# Patient Record
Sex: FEMALE | Race: WHITE | NOT HISPANIC OR LATINO | Employment: OTHER | URBAN - METROPOLITAN AREA
[De-identification: names, ages, dates, MRNs, and addresses within clinical notes are randomized per-mention and may not be internally consistent; named-entity substitution may affect disease eponyms.]

---

## 2019-09-27 ENCOUNTER — TELEPHONE (OUTPATIENT)
Dept: PHARMACY | Facility: CLINIC | Age: 59
End: 2019-09-27

## 2019-10-01 NOTE — TELEPHONE ENCOUNTER
DOCUMENTATION ONLY:  Prior authorization for HUMIRA PEN 40mg/0.8mL approved from 10/01/2019 to 04/01/2020.    Case ID# [id number]     Co-pay: $0    Patient Assistance IS NOT required.     Forward to clinical pharmacist for consult & shipment.

## 2019-11-29 ENCOUNTER — TELEPHONE (OUTPATIENT)
Dept: PHARMACY | Facility: CLINIC | Age: 59
End: 2019-11-29

## 2019-12-03 PROBLEM — M16.11 PRIMARY OSTEOARTHRITIS OF RIGHT HIP: Status: ACTIVE | Noted: 2019-12-03

## 2019-12-26 NOTE — TELEPHONE ENCOUNTER
Patient returned call & confirmed shipment of Humira refill. Verified 2 patient identifiers. Patient approved $0 copay. No new medications, allergies, or health conditions. Confirmed that dosage has not changed. No missed doses. 0 doses remaining. Next dose due on Wed 1/1. Shipping address confirmed. Signature requirement declined. Patient confirmed that no supplies are needed. Medication will ship Mon 12/30 for Tues 12/31 delivery. Patient voiced understanding & has no questions or concerns at this time. ANNIKA

## 2020-02-19 ENCOUNTER — TELEPHONE (OUTPATIENT)
Dept: PHARMACY | Facility: CLINIC | Age: 60
End: 2020-02-19

## 2020-02-19 PROBLEM — N17.9 AKI (ACUTE KIDNEY INJURY): Status: ACTIVE | Noted: 2020-02-19

## 2020-02-19 PROBLEM — I10 HYPERTENSION: Status: ACTIVE | Noted: 2020-02-19

## 2020-02-19 PROBLEM — R42 DIZZINESS: Status: ACTIVE | Noted: 2020-02-19

## 2020-02-19 PROBLEM — I95.9 HYPOTENSION: Status: ACTIVE | Noted: 2020-02-19

## 2020-02-19 PROBLEM — E87.6 HYPOKALEMIA: Status: ACTIVE | Noted: 2020-02-19

## 2020-02-19 PROBLEM — M06.9 RA (RHEUMATOID ARTHRITIS): Status: ACTIVE | Noted: 2020-02-19

## 2020-02-19 PROBLEM — E86.1 INTRAVASCULAR VOLUME DEPLETION: Status: ACTIVE | Noted: 2020-02-19

## 2020-02-20 PROBLEM — E87.6 HYPOKALEMIA: Status: RESOLVED | Noted: 2020-02-19 | Resolved: 2020-02-20

## 2020-02-20 PROBLEM — N17.9 AKI (ACUTE KIDNEY INJURY): Status: RESOLVED | Noted: 2020-02-19 | Resolved: 2020-02-20

## 2020-03-18 ENCOUNTER — TELEPHONE (OUTPATIENT)
Dept: PHARMACY | Facility: CLINIC | Age: 60
End: 2020-03-18

## 2020-04-15 ENCOUNTER — TELEPHONE (OUTPATIENT)
Dept: PHARMACY | Facility: CLINIC | Age: 60
End: 2020-04-15

## 2020-06-09 ENCOUNTER — TELEPHONE (OUTPATIENT)
Dept: PHARMACY | Facility: CLINIC | Age: 60
End: 2020-06-09

## 2020-07-08 ENCOUNTER — TELEPHONE (OUTPATIENT)
Dept: PHARMACY | Facility: CLINIC | Age: 60
End: 2020-07-08

## 2020-07-09 ENCOUNTER — TELEPHONE (OUTPATIENT)
Dept: PHARMACY | Facility: CLINIC | Age: 60
End: 2020-07-09

## 2020-07-09 NOTE — TELEPHONE ENCOUNTER
Pt was reached on 7/9 regarding switch to Humira CF formulation. Informed pt of differences between Humira and Humria CF ( citrate-free, 0.4ml, smaller needle size). Pt voiced understanding and plans to begin CF with next refill. Will update fill shipping 7/13 for 7/14 delivery to CF formulation since original formulation Rx discontinued. Next dose due 7/15. Pt had no further questions or concerns, and was advised to call [facility] should any arise.

## 2020-07-09 NOTE — TELEPHONE ENCOUNTER
Informed Patient  that [facility] received prescription for Huymira CF and benefits investigation is required.  [facility] will be back in touch once insurance determination is received.    Informed patient that [facility] received prescription for ARAVA. Patient requested to have medication sent to [facility] [address] [address]

## 2020-08-05 ENCOUNTER — TELEPHONE (OUTPATIENT)
Dept: PHARMACY | Facility: CLINIC | Age: 60
End: 2020-08-05

## 2020-09-15 NOTE — TELEPHONE ENCOUNTER
Confirmed Humira shipment  to arrive to patient home . Dr Holloway stated that patient was okay to resume Humira instead of Humira CF since patient was hesitant to restart CF formulation after recent hospitalization/fluctuation in sodium levels. Patient stated that she plans to restart Humira Thursday,  once shipment received. Address and  verified. $0 copay (004).

## 2020-10-13 ENCOUNTER — TELEPHONE (OUTPATIENT)
Dept: PHARMACY | Facility: CLINIC | Age: 60
End: 2020-10-13

## 2020-10-13 NOTE — TELEPHONE ENCOUNTER
Confirmed Humira shipment 10/15 to arrive to patient home 10/16. Patient reported one dose on hand and one missed dose in the past month. Patient stated she was recently bit by something and prescribed Bactrim for treatment - has been holding Humira appropriately while on antibiotics. Advised patient that she could take her next dose Saturday after completion of antibiotics. Reviewed with patient to hold therapy if she's running fever or showing any signs/symptoms of infection. Patient voiced understanding. Address and  verifed. $0 copay (004).

## 2020-11-20 ENCOUNTER — SPECIALTY PHARMACY (OUTPATIENT)
Dept: PHARMACY | Facility: CLINIC | Age: 60
End: 2020-11-20

## 2020-11-20 DIAGNOSIS — M06.9 RHEUMATOID ARTHRITIS, INVOLVING UNSPECIFIED SITE, UNSPECIFIED WHETHER RHEUMATOID FACTOR PRESENT: Primary | ICD-10-CM

## 2020-11-20 NOTE — TELEPHONE ENCOUNTER
Specialty Pharmacy - Refill Coordination    Specialty Medication Orders Linked to Encounter      Most Recent Value   Medication #1  adalimumab (HUMIRA PEN) 40 mg/0.8 mL PnKt (Order#[id number], Rx#[id number])          Refill Questions - Documented Responses      Most Recent Value   Relationship to patient of person spoken to?  Self   HIPAA/medical authority confirmed?  Yes   Any changes in contact preferences or allowed representatives?  No   Has the patient had any insurance changes?  No   Has the patient had any changes to specialty medication, dose, or instructions?  No   Has the patient started taking any new medications, herbals, or supplements?  No   Has the patient been diagnosed with any new medical conditions?  No   Does the patient have any new allergies to medications or foods?  No   Does the patient have any concerns about side effects?  No   Can the patient store medication/sharps container properly (at the correct temperature, away from children/pets, etc.)?  Yes   Can the patient call emergency services (911) in the event of an emergency?  Yes   Does the patient have any concerns or questions about taking or administering this medication as prescribed?  No   How many doses did the patient miss in the past 4 weeks or since the last fill?  0   How many doses does the patient have on hand?  0   Does the number of doses/days supply remaining match pharmacy expected amounts?  Yes   Does the patient feel that this medication is effective?  Yes   During the past 4 weeks, has patient missed any activities due to condition or medication?  No   During the past 4 weeks, did patient have any of the following urgent care visits?  None   How will the patient receive the medication?  Mail   When does the patient need to receive the medication?  11/27/20   Shipping Address  Home   Address in [facility] confirmed and updated if neccessary?  Yes   Expected Copay ($)  0   Is the patient able to afford the  medication copay?  Yes   Payment Method  zero copay   Days supply of Refill  28   Would patient like to speak to a pharmacist?  No   Do you want to trigger an intervention?  No   Do you want to trigger an additional referral task?  No   Refill activity completed?  Yes   Refill activity plan  Refill scheduled   Shipment/Pickup Date:  11/23/20          Current Outpatient Medications   Medication Sig    adalimumab (HUMIRA PEN) 40 mg/0.8 mL PnKt Inject 1 pen (40 mg total) into the skin every 14 (fourteen) days.    albuterol (PROVENTIL/VENTOLIN HFA) 90 mcg/actuation inhaler INHALE 2 PUFFS BY MOUTH EVERY 6 HOURS AS NEEDED FOR SHORTNESS OF BREATH or cough    amLODIPine (NORVASC) 5 MG tablet Take 5 mg by mouth once daily.    cyclobenzaprine (FLEXERIL) 10 MG tablet Take 10 mg by mouth every 8 (eight) hours as needed.    DULERA 200-5 mcg/actuation inhaler INHALE 2 PUFFS BY MOUTH TWICE A DAY AS DIRECTED    fluticasone propionate (FLONASE) 50 mcg/actuation nasal spray USE 1 SPRAY IN EACH NOSTRIL ONCE DAILY FOR 7 DAYS (60 day supply)    ibuprofen (ADVIL,MOTRIN) 600 MG tablet Take 600 mg by mouth 4 (four) times daily as needed.    indomethacin (INDOCIN) 25 MG capsule Take 25 mg by mouth.    leflunomide (ARAVA) 20 MG Tab Take 1 tablet (20 mg total) by mouth once daily.    lisinopriL 10 MG tablet Take 10 mg by mouth once daily.    lisinopriL-hydrochlorothiazide (PRINZIDE,ZESTORETIC) 10-12.5 mg per tablet Take 1 tablet by mouth once daily.     pantoprazole (PROTONIX) 40 MG tablet Take 40 mg by mouth once daily.    potassium chloride (KLOR-CON) 10 MEQ TbSR     predniSONE (DELTASONE) 5 MG tablet TAKE 1/2 TABLET BY MOUTH ONCE DAILY IN THE MORNING    promethazine (PHENERGAN) 12.5 MG Tab Take 1 tablet (12.5 mg total) by mouth every 8 (eight) hours as needed.   Last reviewed on 11/20/2020  2:07 PM by Carol Capps    Review of patient's allergies indicates:   Allergen Reactions    Melatonin Other (See Comments)      Restless leg syndrome    Last reviewed on  11/20/2020 2:07 PM by Carol Capps      Tasks added this encounter   12/18/2020 - Refill Call (Auto Added)   Tasks due within next 3 months   12/3/2020 - Clinical - Follow Up Assesement (90 day)     Carol Capps  [facility] - Specialty Pharm[facility]  [address] [place] [address]  Phone: [phone]  Fax: [phone]

## 2020-12-04 ENCOUNTER — SPECIALTY PHARMACY (OUTPATIENT)
Dept: PHARMACY | Facility: CLINIC | Age: 60
End: 2020-12-04

## 2020-12-04 DIAGNOSIS — M06.9 RHEUMATOID ARTHRITIS, INVOLVING UNSPECIFIED SITE, UNSPECIFIED WHETHER RHEUMATOID FACTOR PRESENT: Primary | ICD-10-CM

## 2020-12-04 NOTE — TELEPHONE ENCOUNTER
Specialty Pharmacy - Clinical Reassessment    Specialty Medication Orders Linked to Encounter      Most Recent Value   Medication #1  adalimumab (HUMIRA PEN) 40 mg/0.8 mL PnKt (Order#[id number], Rx#[id number])        Sally Lagunas is a 60 y.o. female, who is followed by the specialty pharmacy service for management and education.    Recent Encounters     Date Type Provider Description    12/04/2020 Specialty Pharmacy Candy Guerrero PharmD Follow-up Clinical Reassessment    11/20/2020 Specialty Pharmacy Carol Capps Refill Coordination        Clinical call attempts since last clinical assessment   No call attempts found.     Today she received follow up education for her specialty medication(s).    Current Outpatient Medications   Medication Sig Note    adalimumab (HUMIRA PEN) 40 mg/0.8 mL PnKt Inject 1 pen (40 mg total) into the skin every 14 (fourteen) days.     albuterol (PROVENTIL/VENTOLIN HFA) 90 mcg/actuation inhaler INHALE 2 PUFFS BY MOUTH EVERY 6 HOURS AS NEEDED FOR SHORTNESS OF BREATH or cough     cyclobenzaprine (FLEXERIL) 10 MG tablet Take 10 mg by mouth every 8 (eight) hours as needed.     DULERA 200-5 mcg/actuation inhaler INHALE 2 PUFFS BY MOUTH TWICE A DAY AS DIRECTED     fluticasone propionate (FLONASE) 50 mcg/actuation nasal spray USE 1 SPRAY IN EACH NOSTRIL ONCE DAILY FOR 7 DAYS (60 day supply)     ibuprofen (ADVIL,MOTRIN) 600 MG tablet Take 600 mg by mouth 4 (four) times daily as needed.     lisinopriL 10 MG tablet Take 10 mg by mouth once daily.     leflunomide (ARAVA) 20 MG Tab Take 1 tablet (20 mg total) by mouth once daily. 12/4/2020: Patient stated she has been advised to hold Leflunomide    Last reviewed on 12/4/2020  1:23 PM by Candy Guerrero, PharmD    Review of patient's allergies indicates:   Allergen Reactions    Melatonin Other (See Comments)     Restless leg syndrome   Last reviewed on  12/4/2020 12:57 PM by Candy Guerrero    Drug Interactions    Drug  interactions evaluated: yes  Clinically relevant drug interactions identified: no  Provided the patient with educational material regarding drug interactions: not applicable       Medication Adherence    What concerns does the patient have in regards to their medications: Patient reproted no concerns with Humira. She stated she has missed 0 doses. Next dose due Sat 12/5. Patient stated she has 1 pen on hand.   Patient reported X missed doses in the last month: 0  Any gaps in refill history greater than 2 weeks in the last 3 months: no  Demonstrates understanding of importance of adherence: yes  Informant: patient  Reliability of informant: reliable  Provider-estimated medication adherence level: good  Reasons for non-adherence: no problems identified  Confirmed plan for next specialty medication refill: not applicable  Refills needed for supportive medications: not needed       Adverse Effects    Arthralgias: Pos       Assessment Questions - Documented Responses      Most Recent Value   Assessment   Medication Reconciliation completed for patient  Yes   During the past 4 weeks, has patient missed any activities due to condition or medication?  No   During the past 4 weeks, did patient have any of the following urgent care visits?  None   Goals of Therapy Status  Partially achieving   Welcome packet contents reviewed and discussed with patient?  No   Assesment completed?  Yes   Plan  Therapy continued   Do you need to open a clinical intervention (i-vent)?  No   Do you want to schedule first shipment?  No   Medication #1 Assessment Info   Patient status  Existing medication, Exisiting to [facility]   Is this medication appropriate for the patient?  Yes   Is this medication effective?  Yes          Objective    She has a past medical history of Hypertension, Hypertension, and RA (rheumatoid arthritis).      BP Readings from Last 4 Encounters:   11/17/20 118/85   07/09/20 107/67   02/20/20 (!) 96/58   02/19/20 110/72     Ht  "Readings from Last 4 Encounters:   11/17/20 5' 3" (1.6 m)   07/09/20 5' 3" (1.6 m)   02/19/20 5' 3" (1.6 m)   02/19/20 5' 3" (1.6 m)     Wt Readings from Last 4 Encounters:   11/17/20 57.5 kg (126 lb 12.8 oz)   07/09/20 65.4 kg (144 lb 3.2 oz)   02/19/20 67.1 kg (148 lb)   02/19/20 67.3 kg (148 lb 6.4 oz)     Recent Labs   Lab Result Units 11/17/20  1159   Creatinine mg/dL 0.89   ALT U/L 17   AST U/L 28     The goals of rheumatoid arthritis treatment include:  · Relieving pain and suppressing inflammation  · Achieving remission and preventing joint and organ damage  · Increasing joint mobility and strength  · Preventing infection and other complications of treatment  · Reducing long term complications of rheumatoid arthritis  · Improving or maintaining physical function and optimal well-being  · Improving or maintaining quality of life  · Maintaining optimal therapy adherence  · Minimizing and managing side effects    Goals of Therapy Status: Partially achieving    Assessment/Plan  Patient plans to continue therapy without changes      Indication, dosage, appropriateness, effectiveness, safety and convenience of her specialty medication(s) were reviewed today.     Patient Counseling    Counseled the patient on the following: doses and administration discussed, possible adverse effects and management discussed, possible drug and prescription drug interactions discussed, possible drug and OTC drug and food interactions discussed, lab monitoring and follow-up discussed, therapeutic rationale discussed, adherence and missed doses discussed       Called patient on 12/4 for Humira follow up. Patient reproted no concerns with Humira. She stated she has missed 0 doses. Next dose due Sat 12/5. Patient stated she has 1 pen on hand.     Medication list was reviewed. She stated she is no longer taking Norvasc, Lisinopril-HCTZ, Indocin, Protonix, Potassium, or Phenergan. No DDIs of concern upon review. Pt stated she is holding " LFA at this time, per rheum advisement, due to weight loss.  She stated she has allergy to diphenhydramine and melatonin, causing leg spasm and RLS. Pt reported no other known allergies.     She stated she is comfortable with the Humira injection process, and has been rotating injection sites. She denies any side effects. Patient reported no missed plans in the last month due to RA. She reported no ER/UC visits in the last month. Patient stated she has benefited from Humira. She stated she has hand stiffness in the mornings that lasts about 30 minutes. She reported pain in hands (mostly), hips (had a replacement), and feet. Pt rated pain 5-6/10. She reported a little joint swelling. Patient had no further questions or concerns.     Updated labs reviewed (11/17/2020): plts elevated but improved (436); WBC, RBC, Hgb/Hct, ANC WNL; Lymph% low; LFTs WNL. No dose adjustments necessary. TB and Hep B negative 7/9/2020.  Tasks added this encounter   2/25/2021 - Clinical - Follow Up Assesement (90 day)   Tasks due within next 3 months   12/18/2020 - Refill Call (Auto Added)     Candy Guerrero PharmD  [facility] - Specialty Pharmacy  [address] [place] [id number]  Phone: [phone]  Fax: [phone]

## 2020-12-16 ENCOUNTER — SPECIALTY PHARMACY (OUTPATIENT)
Dept: PHARMACY | Facility: CLINIC | Age: 60
End: 2020-12-16

## 2020-12-16 NOTE — TELEPHONE ENCOUNTER
Specialty Pharmacy - Refill Coordination    Specialty Medication Orders Linked to Encounter      Most Recent Value   Medication #1  adalimumab (HUMIRA PEN) 40 mg/0.8 mL PnKt (Order#[id number], Rx#[id number])          Refill Questions - Documented Responses      Most Recent Value   Relationship to patient of person spoken to?  Self   HIPAA/medical authority confirmed?  Yes   Any changes in contact preferences or allowed representatives?  No   Has the patient had any insurance changes?  No   Has the patient had any changes to specialty medication, dose, or instructions?  No   Has the patient started taking any new medications, herbals, or supplements?  No   Has the patient been diagnosed with any new medical conditions?  No   Does the patient have any new allergies to medications or foods?  No   Does the patient have any concerns about side effects?  No   Can the patient store medication/sharps container properly (at the correct temperature, away from children/pets, etc.)?  Yes   Can the patient call emergency services (911) in the event of an emergency?  Yes   Does the patient have any concerns or questions about taking or administering this medication as prescribed?  No   How many doses did the patient miss in the past 4 weeks or since the last fill?  0   How many doses does the patient have on hand?  0   How many days does the patient report on hand quantity will last?  2   Does the number of doses/days supply remaining match pharmacy expected amounts?  Yes   Does the patient feel that this medication is effective?  Yes   During the past 4 weeks, has patient missed any activities due to condition or medication?  No   During the past 4 weeks, did patient have any of the following urgent care visits?  None   How will the patient receive the medication?  Mail   When does the patient need to receive the medication?  12/18/20   Shipping Address  Home   Address in [facility] confirmed and updated if neccessary?   Yes   Expected Copay ($)  0   Is the patient able to afford the medication copay?  Yes   Payment Method  zero copay   Days supply of Refill  28   Would patient like to speak to a pharmacist?  No   Do you want to trigger an intervention?  No   Do you want to trigger an additional referral task?  No   Refill activity completed?  Yes   Refill activity plan  Refill scheduled   Shipment/Pickup Date:  12/17/20          Current Outpatient Medications   Medication Sig    adalimumab (HUMIRA PEN) 40 mg/0.8 mL PnKt Inject 1 pen (40 mg total) into the skin every 14 (fourteen) days.    albuterol (PROVENTIL/VENTOLIN HFA) 90 mcg/actuation inhaler INHALE 2 PUFFS BY MOUTH EVERY 6 HOURS AS NEEDED FOR SHORTNESS OF BREATH or cough    cyclobenzaprine (FLEXERIL) 10 MG tablet Take 10 mg by mouth every 8 (eight) hours as needed.    DULERA 200-5 mcg/actuation inhaler INHALE 2 PUFFS BY MOUTH TWICE A DAY AS DIRECTED    fluticasone propionate (FLONASE) 50 mcg/actuation nasal spray USE 1 SPRAY IN EACH NOSTRIL ONCE DAILY FOR 7 DAYS (60 day supply)    ibuprofen (ADVIL,MOTRIN) 600 MG tablet Take 600 mg by mouth 4 (four) times daily as needed.    leflunomide (ARAVA) 20 MG Tab Take 1 tablet (20 mg total) by mouth once daily.    lisinopriL 10 MG tablet Take 10 mg by mouth once daily.   Last reviewed on 12/4/2020  1:23 PM by Candy Guerrero, PharmD    Review of patient's allergies indicates:   Allergen Reactions    Diphenhydramine      Restless leg syndrome / leg spasm    Melatonin Other (See Comments)     Restless leg syndrome    Last reviewed on  12/4/2020 12:57 PM by Candy Guerrero      Tasks added this encounter   No tasks added.   Tasks due within next 3 months   12/18/2020 - Refill Call (Auto Added)  2/25/2021 - Clinical - Follow Up Assesement (90 day)     [facility]  [address] [place] [id number]  Phone: [phone]  Fax: [phone]

## 2021-01-11 ENCOUNTER — SPECIALTY PHARMACY (OUTPATIENT)
Dept: PHARMACY | Facility: CLINIC | Age: 61
End: 2021-01-11

## 2021-02-05 ENCOUNTER — SPECIALTY PHARMACY (OUTPATIENT)
Dept: PHARMACY | Facility: CLINIC | Age: 61
End: 2021-02-05

## 2021-02-10 PROBLEM — M87.9 OSTEONECROSIS OF RIGHT HIP: Status: ACTIVE | Noted: 2021-02-10

## 2021-02-10 PROBLEM — M25.551 CHRONIC RIGHT HIP PAIN: Status: ACTIVE | Noted: 2021-02-10

## 2021-02-10 PROBLEM — G89.29 CHRONIC RIGHT HIP PAIN: Status: ACTIVE | Noted: 2021-02-10

## 2021-03-08 ENCOUNTER — SPECIALTY PHARMACY (OUTPATIENT)
Dept: PHARMACY | Facility: CLINIC | Age: 61
End: 2021-03-08

## 2021-03-31 ENCOUNTER — SPECIALTY PHARMACY (OUTPATIENT)
Dept: PHARMACY | Facility: CLINIC | Age: 61
End: 2021-03-31

## 2021-05-01 ENCOUNTER — SPECIALTY PHARMACY (OUTPATIENT)
Dept: PHARMACY | Facility: CLINIC | Age: 61
End: 2021-05-01

## 2021-05-26 ENCOUNTER — SPECIALTY PHARMACY (OUTPATIENT)
Dept: PHARMACY | Facility: CLINIC | Age: 61
End: 2021-05-26

## 2022-03-31 ENCOUNTER — SPECIALTY PHARMACY (OUTPATIENT)
Dept: PHARMACY | Facility: CLINIC | Age: 62
End: 2022-03-31

## 2022-03-31 NOTE — TELEPHONE ENCOUNTER
Pt called [facility] for Humira refill.  She was previously de-enrolled ss she was holding off on Humira while recovering from GI surgery.      PA is on file.  LIS level 1.  Copay $9.85.

## 2022-03-31 NOTE — TELEPHONE ENCOUNTER
Specialty Pharmacy - Medication/Referral Authorization  Specialty Pharmacy - Initial Clinical Assessment    Specialty Medication Orders Linked to Encounter    Flowsheet Row Most Recent Value   Medication #1 adalimumab (HUMIRA PEN) PnKt injection (Order#[id number], Rx#[id number])        Patient Diagnosis   M06.9 - RA (rheumatoid arthritis)    Sally Lagunas is a 61 y.o. female, who is followed by the specialty pharmacy service for management and education.    Recent Encounters     Date Type Provider Description    2022 Specialty Pharmacy Margot Wilson, [place] Referral Authorization; Initial Clinical Assessment    2021 Specialty Pharmacy Candy Urias, [place] Clinical Intervention    2021 Specialty Pharmacy Nancy Alas Refill Coordination    2021 Specialty Pharmacy Janette Gillespie Refill Coordination    2021 Specialty Pharmacy Kena Busch Refill Coordination        Clinical call attempts since last clinical assessment   No call attempts found.     Current Outpatient Medications   Medication Sig    adalimumab (HUMIRA PEN) PnKt injection Inject 1 pen (40 mg total) into the skin every 14 (fourteen) days.    albuterol (PROVENTIL) 2.5 mg /3 mL (0.083 %) nebulizer solution SMARTSI Vial(s) Via Nebulizer Every 6-8 Hours PRN    albuterol (PROVENTIL/VENTOLIN HFA) 90 mcg/actuation inhaler INHALE 2 PUFFS BY MOUTH EVERY 6 HOURS AS NEEDED FOR SHORTNESS OF BREATH or cough    DULERA 200-5 mcg/actuation inhaler INHALE 2 PUFFS BY MOUTH TWICE A DAY AS DIRECTED    fluticasone propionate (FLONASE) 50 mcg/actuation nasal spray USE 1 SPRAY IN EACH NOSTRIL ONCE DAILY FOR 7 DAYS (60 day supply)    hydrOXYzine pamoate (VISTARIL) 25 MG Cap     ibuprofen (ADVIL,MOTRIN) 600 MG tablet Take 600 mg by mouth 4 (four) times daily as needed.    leflunomide (ARAVA) 20 MG Tab Take 1 tablet (20 mg total) by mouth once daily.    potassium chloride (KLOR-CON) 10 MEQ TbSR Take 10 mEq by  mouth 2 (two) times daily.   Last reviewed on 3/31/2022  6:49 PM by Margot Wilson, PharmD    Review of patient's allergies indicates:   Allergen Reactions    Diphenhydramine      Restless leg syndrome / leg spasm    Melatonin Other (See Comments)     Restless leg syndrome    Nickel sutures [surgical stainless steel] Rash   Last reviewed on  3/31/2022 6:49 PM by Margot Wilson    Drug Interactions    Drug interactions evaluated: yes  Clinically relevant drug interactions identified: no  Provided the patient with educational material regarding drug interactions: not applicable         Adverse Effects    Arthralgias: Pos       Assessment Questions - Documented Responses    Flowsheet Row Most Recent Value   Assessment    Medication Reconciliation completed for patient Yes   During the past 4 weeks, did patient have any of the following urgent care visits? None   Goals of Therapy Status --  [Re-starting therapy]   Status of the patients ability to self-administer: Is Able   All education points have been covered with patient? No, patient declined- printed education provided   Welcome packet contents reviewed and discussed with patient? Yes   Assesment completed? Yes   Plan --  [Therapy re-started]   Do you need to open a clinical intervention (i-vent)? No   Do you want to schedule first shipment? Yes   Medication #1 Assessment Info    Patient status Existing medication, Exisiting to [facility]   Is this medication appropriate for the patient? Yes   Is this medication effective? Yes        Refill Questions - Documented Responses    Flowsheet Row Most Recent Value   Refill Screening Questions    When does the patient need to receive the medication? 04/14/22   Refill Delivery Questions    How will the patient receive the medication? Mail   When does the patient need to receive the medication? 04/14/22   Shipping Address Home   Address in [facility] confirmed and updated if neccessary? Yes   Expected Copay ($) 9.85   Is  "the patient able to afford the medication copay? Yes   Payment Method CC on file   Days supply of Refill 28   Supplies needed? No supplies needed   Refill activity completed? Yes   Refill activity plan Refill scheduled   Shipment/Pickup Date: 22          Objective    She has a past medical history of COPD (chronic obstructive pulmonary disease), Hypertension, and RA (rheumatoid arthritis).      BP Readings from Last 4 Encounters:   22 135/82   22 122/70   21 100/60   21 118/79     Ht Readings from Last 4 Encounters:   22 5' 3" (1.6 m)   22 5' 3" (1.6 m)   21 5' 3" (1.6 m)   21 5' 3" (1.6 m)     Wt Readings from Last 4 Encounters:   22 49.2 kg (108 lb 6.4 oz)   22 48.5 kg (106 lb 14.4 oz)   21 48.5 kg (107 lb)   21 48.7 kg (107 lb 6.4 oz)     Recent Labs   Lab Result Units 22  1019   Creatinine mg/dL 1.08   ALT U/L 19   AST U/L 20   Hepatitis B Surface Ag  Negative     The goals of rheumatoid arthritis treatment include:  · Relieving pain and suppressing inflammation  · Achieving remission and preventing joint and organ damage  · Increasing joint mobility and strength  · Preventing infection and other complications of treatment  · Reducing long term complications of rheumatoid arthritis  · Improving or maintaining physical function and optimal well-being  · Improving or maintaining quality of life  · Maintaining optimal therapy adherence  · Minimizing and managing side effects         Assessment/Plan  Patient plans to start therapy on 3/31/22.  She will use the 1 Humira pen she has on hand that is not  and has been stored in the fridge.    She was previously de-enrolled as she was holding off on Humira while recovering from GI surgery.  Pt has been cleared to re-start Humira by Rhe[facility].  She declined Humira review today, stating she is already very comfortable with this information.  She remembers how to inject and the side " effect profile of Humira.     Med and allergy list was updated.     Indication, dosage, appropriateness, effectiveness, safety and convenience of her specialty medication(s) were reviewed today.     Patient Education   Pharmacist offer to  patient was declined. Printed educational materials will be provided with medication.  Patient did accept verbal education on the following topics:  · Storage, safe handling, and disposal        Tasks added this encounter   5/5/2022 - Refill Call (Auto Added)  12/31/2022 - Clinical - Follow Up Assesement (Annual)   Tasks due within next 3 months   No tasks due.     Margot Wilson, PharmD  [facility] - Specialty Pharmacy  [address] [address]  Phone: [phone]  Fax: [phone]

## 2022-04-06 ENCOUNTER — SPECIALTY PHARMACY (OUTPATIENT)
Dept: PHARMACY | Facility: CLINIC | Age: 62
End: 2022-04-06

## 2022-04-06 DIAGNOSIS — M06.9 RHEUMATOID ARTHRITIS, INVOLVING UNSPECIFIED SITE, UNSPECIFIED WHETHER RHEUMATOID FACTOR PRESENT: Primary | ICD-10-CM

## 2022-04-06 NOTE — TELEPHONE ENCOUNTER
Pt called today to notify [facility] she had a follow-up appointment today with [facility] after recent breast biopsy. Her surgeon told her she has developed an infection at wound site that required packing and treatment with ABX. She states she was prescribed Clindamycin 300 mg TID X 7 days.     Pt advised to hold off on Humira start until ABX completion and wound infection resolution. counseled pt on proper storage of Humira in refrigerator in its original container until she is ready to resume Humira injections. Pt voiced understanding and agreed to a follow-up call after ABX complete in 7 days. Pt had no further questions or concerns and she is aware to contact [facility] should she have any questions.

## 2022-05-05 NOTE — TELEPHONE ENCOUNTER
Pt states she still has not started Humira. She did not wish to discuss further and states she will reach out to [facility] when she needs a new refill.

## 2022-05-05 NOTE — TELEPHONE ENCOUNTER
Changing enrollment to dispense only but opening ivent so [facility] speaks with pt when she calls back.

## 2022-06-16 NOTE — TELEPHONE ENCOUNTER
pt again declined further assistance/guidance regarding Humira therapy. She states she still has not started and again reiterated she will contact [facility] when she needs Humira refilled. Provider messaged to notify, iris closed.